# Patient Record
Sex: MALE | Race: WHITE | URBAN - METROPOLITAN AREA
[De-identification: names, ages, dates, MRNs, and addresses within clinical notes are randomized per-mention and may not be internally consistent; named-entity substitution may affect disease eponyms.]

---

## 2017-08-05 ENCOUNTER — OFFICE VISIT (OUTPATIENT)
Dept: URGENT CARE | Facility: URGENT CARE | Age: 32
End: 2017-08-05

## 2017-08-05 VITALS
WEIGHT: 175.8 LBS | DIASTOLIC BLOOD PRESSURE: 70 MMHG | HEART RATE: 89 BPM | TEMPERATURE: 98.1 F | SYSTOLIC BLOOD PRESSURE: 132 MMHG | OXYGEN SATURATION: 100 %

## 2017-08-05 DIAGNOSIS — M25.512 ACUTE PAIN OF LEFT SHOULDER: Primary | ICD-10-CM

## 2017-08-05 PROCEDURE — 99213 OFFICE O/P EST LOW 20 MIN: CPT | Performed by: FAMILY MEDICINE

## 2017-08-05 RX ORDER — OXYCODONE AND ACETAMINOPHEN 5; 325 MG/1; MG/1
1-2 TABLET ORAL EVERY 6 HOURS PRN
Qty: 24 TABLET | Refills: 0 | Status: SHIPPED | OUTPATIENT
Start: 2017-08-05

## 2017-08-05 NOTE — PATIENT INSTRUCTIONS
Ice onto the painful area for about 20 minutes at a time, every 2-3 hours while awake.     Wear a shoulder sling for now    follow up with an orthopedic surgeon as soon as you return to [place].     ibuprofen

## 2017-08-05 NOTE — MR AVS SNAPSHOT
"              After Visit Summary   8/5/2017    jA Strak Jr.    MRN: [id number]           Patient Information     Date Of Birth          1985        Visit Information        Provider Department      8/5/2017 4:45 PM Erik Hare MD [facility]        Today's Diagnoses     Acute pain of left shoulder    -  1      Care Instructions    Ice onto the painful area for about 20 minutes at a time, every 2-3 hours while awake.     Wear a shoulder sling for now    follow up with an orthopedic surgeon as soon as you return to [place].     ibuprofen          Follow-ups after your visit        Who to contact     If you have questions or need follow up information about today's clinic visit or your schedule please contact [facility] directly at [phone].  Normal or non-critical lab and imaging results will be communicated to you by Previstarhart, letter or phone within 4 business days after the clinic has received the results. If you do not hear from us within 7 days, please contact the clinic through Previstarhart or phone. If you have a critical or abnormal lab result, we will notify you by phone as soon as possible.  Submit refill requests through Xiaomi or call your pharmacy and they will forward the refill request to us. Please allow 3 business days for your refill to be completed.          Additional Information About Your Visit        Previstarharstatusboom Information     X[employer] lets you send messages to your doctor, view your test results, renew your prescriptions, schedule appointments and more. To sign up, go to [email or web address] . Click on \"Log in\" on the left side of the screen, which will take you to the Welcome page. Then click on \"Sign up Now\" on the right side of the page.     You will be asked to enter the access code listed below, as well as some personal information. Please follow the directions to create your username and password.     Your access code is: [email or web address]  Expires: " 11/3/2017  6:12 PM     Your access code will  in 90 days. If you need help or a new code, please call your [place] clinic or [phone].        Care EveryWhere ID     This is your Care EveryWhere ID. This could be used by other organizations to access your [place] medical records  [id number]        Your Vitals Were     Pulse Temperature Pulse Oximetry             89 98.1  F (36.7  C) (Oral) 100%          Blood Pressure from Last 3 Encounters:   17 132/70    Weight from Last 3 Encounters:   17 175 lb 12.8 oz (79.7 kg)              Today, you had the following     No orders found for display         Today's Medication Changes          These changes are accurate as of: 17  6:12 PM.  If you have any questions, ask your nurse or doctor.               Start taking these medicines.        Dose/Directions    oxyCODONE-acetaminophen 5-325 MG per tablet   Commonly known as:  PERCOCET   Used for:  Acute pain of left shoulder   Started by:  Erik Hare MD        Dose:  1-2 tablet   Take 1-2 tablets by mouth every 6 hours as needed for moderate to severe pain   Quantity:  24 tablet   Refills:  0            Where to get your medicines      Some of these will need a paper prescription and others can be bought over the counter.  Ask your nurse if you have questions.     Bring a paper prescription for each of these medications     oxyCODONE-acetaminophen 5-325 MG per tablet                Primary Care Provider    None Specified       No primary provider on file.        Equal Access to Services     [facility]: Susan Caldwell, deborah mars, qaybta caseyaldonya devries . So [facility] [phone].    ATENCIÓN: Si habla español, tiene a mckay disposición servicios gratuitos de asistencia lingüística. Llame al [phone].    We comply with applicable federal civil rights laws and [place] laws. We do not discriminate on the basis of race, color, national  origin, age, disability sex, sexual orientation or gender identity.            Thank you!     Thank you for choosing [facility]  for your care. Our goal is always to provide you with excellent care. Hearing back from our patients is one way we can continue to improve our services. Please take a few minutes to complete the written survey that you may receive in the mail after your visit with us. Thank you!             Your Updated Medication List - Protect others around you: Learn how to safely use, store and throw away your medicines at www.disposemymeds.org.          This list is accurate as of: 8/5/17  6:12 PM.  Always use your most recent med list.                   Brand Name Dispense Instructions for use Diagnosis    oxyCODONE-acetaminophen 5-325 MG per tablet    PERCOCET    24 tablet    Take 1-2 tablets by mouth every 6 hours as needed for moderate to severe pain    Acute pain of left shoulder

## 2017-08-05 NOTE — PROGRESS NOTES
SUBJECTIVE:   Aj Stark Jr. is a 32 year old male presenting with a chief complaint of severe left shoulder pain (worse with abduction, extension, external rotation).  Pain rating is 8.5 out of 10.   Onset of symptoms was one day ago. Patient was moving tile around yesterday when the pain started.  No numbness/weakness at the left hand/left wrist/left elbow.     Four months ago, patient was riding a bull and fell off and hit the left shoulder.  Patient had left shoulder pain that eased up after around one month after applying ice.    Course of illness is about the same. .    Severity severe.   Current and Associated symptoms:as listed above.   Treatment measures tried include Ibuprofen 800 mg (last taken this morning ) without relief.     Past medical history:   No major medical problems.     No current outpatient prescriptions on file.     Social History   Substance Use Topics     Smoking status: Former Smoker     Smokeless tobacco: Current User     Types: Chew     Alcohol use Yes   Patient is returning to [place] in six days    ROS:  Review of systems negative except as stated above.    OBJECTIVE  :/70 (BP Location: Right arm, Patient Position: Chair, Cuff Size: Adult Regular)  Pulse 89  Temp 98.1  F (36.7  C) (Oral)  Wt 175 lb 12.8 oz (79.7 kg)  SpO2 100%  GENERAL APPEARANCE: healthy, alert and in severe pain.   Extremities: Left Shoulder has point tenderness over the anterior proximal left shoulder corresponding to the biceps tendon region.  No bony tenderness at the left shoulder.  There is decreased ROM and severe pain with 60 degrees abduction and 50 degrees extension.   SKIN: no suspicious lesions or rashes    ASSESSMENT:  Left Shoulder Pain    PLAN:  Rx:  Percocet  Patient already has a shoulder sling at home.  He will return to the [place], [place], area in 6 days.    Patient will follow up with an orthopedic surgeon once he is back home for further evaluation and treatment.   See orders in  Epic    Erik Hare MD

## 2017-08-05 NOTE — NURSING NOTE
Chief Complaint   Patient presents with     Musculoskeletal Problem     start: flared 1 day ago orignally injured it 4months ago riding bull left shoulder sx:pain, painful with range of motion tx: Ibuprofen not effective        Initial /70 (BP Location: Right arm, Patient Position: Chair, Cuff Size: Adult Regular)  Pulse 89  Temp 98.1  F (36.7  C) (Oral)  Wt 175 lb 12.8 oz (79.7 kg)  SpO2 100% There is no height or weight on file to calculate BMI.  Medication Reconciliation: complete   Marion Isaacs MA